# Patient Record
Sex: MALE | Race: WHITE | NOT HISPANIC OR LATINO | Employment: OTHER
[De-identification: names, ages, dates, MRNs, and addresses within clinical notes are randomized per-mention and may not be internally consistent; named-entity substitution may affect disease eponyms.]

---

## 2017-05-02 ENCOUNTER — RECORDS - HEALTHEAST (OUTPATIENT)
Dept: ADMINISTRATIVE | Facility: OTHER | Age: 62
End: 2017-05-02

## 2017-05-22 ENCOUNTER — HOSPITAL ENCOUNTER (OUTPATIENT)
Dept: NUCLEAR MEDICINE | Facility: CLINIC | Age: 62
Discharge: HOME | End: 2017-05-22
Attending: ORTHOPAEDIC SURGERY

## 2017-05-22 ENCOUNTER — COMMUNICATION - HEALTHEAST (OUTPATIENT)
Dept: TELEHEALTH | Facility: CLINIC | Age: 62
End: 2017-05-22

## 2017-05-22 DIAGNOSIS — M25.562 LEFT KNEE PAIN: ICD-10-CM

## 2018-01-09 ENCOUNTER — RECORDS - HEALTHEAST (OUTPATIENT)
Dept: ADMINISTRATIVE | Facility: OTHER | Age: 63
End: 2018-01-09

## 2021-05-31 ENCOUNTER — RECORDS - HEALTHEAST (OUTPATIENT)
Dept: ADMINISTRATIVE | Facility: CLINIC | Age: 66
End: 2021-05-31

## 2022-01-21 ENCOUNTER — HOSPITAL ENCOUNTER (EMERGENCY)
Facility: CLINIC | Age: 67
Discharge: HOME | End: 2022-01-21
Attending: EMERGENCY MEDICINE | Admitting: EMERGENCY MEDICINE
Payer: MEDICARE

## 2022-01-21 ENCOUNTER — APPOINTMENT (OUTPATIENT)
Dept: ULTRASOUND IMAGING | Facility: CLINIC | Age: 67
End: 2022-01-21
Attending: EMERGENCY MEDICINE
Payer: MEDICARE

## 2022-01-21 ENCOUNTER — APPOINTMENT (OUTPATIENT)
Dept: CT IMAGING | Facility: CLINIC | Age: 67
End: 2022-01-21
Attending: EMERGENCY MEDICINE
Payer: MEDICARE

## 2022-01-21 VITALS
HEART RATE: 67 BPM | SYSTOLIC BLOOD PRESSURE: 129 MMHG | TEMPERATURE: 97.8 F | WEIGHT: 185 LBS | OXYGEN SATURATION: 95 % | RESPIRATION RATE: 30 BRPM | DIASTOLIC BLOOD PRESSURE: 86 MMHG

## 2022-01-21 DIAGNOSIS — I48.0 PAROXYSMAL ATRIAL FIBRILLATION (H): ICD-10-CM

## 2022-01-21 LAB
ALBUMIN SERPL-MCNC: 4.3 G/DL (ref 3.5–5)
ALP SERPL-CCNC: 35 U/L (ref 45–120)
ALT SERPL W P-5'-P-CCNC: 27 U/L (ref 0–45)
ANION GAP SERPL CALCULATED.3IONS-SCNC: 10 MMOL/L (ref 5–18)
AST SERPL W P-5'-P-CCNC: 30 U/L (ref 0–40)
ATRIAL RATE - MUSE: 150 BPM
BILIRUB SERPL-MCNC: 0.9 MG/DL (ref 0–1)
BNP SERPL-MCNC: 21 PG/ML (ref 0–59)
BUN SERPL-MCNC: 24 MG/DL (ref 8–22)
CALCIUM SERPL-MCNC: 10.3 MG/DL (ref 8.5–10.5)
CHLORIDE BLD-SCNC: 107 MMOL/L (ref 98–107)
CO2 SERPL-SCNC: 22 MMOL/L (ref 22–31)
CREAT SERPL-MCNC: 1.15 MG/DL (ref 0.7–1.3)
D DIMER PPP FEU-MCNC: 0.66 UG/ML FEU (ref 0–0.5)
DIASTOLIC BLOOD PRESSURE - MUSE: NORMAL MMHG
ERYTHROCYTE [DISTWIDTH] IN BLOOD BY AUTOMATED COUNT: 13.9 % (ref 10–15)
GFR SERPL CREATININE-BSD FRML MDRD: 71 ML/MIN/1.73M2
GLUCOSE BLD-MCNC: 114 MG/DL (ref 70–125)
HCT VFR BLD AUTO: 54.1 % (ref 40–53)
HGB BLD-MCNC: 18.2 G/DL (ref 13.3–17.7)
HOLD SPECIMEN: NORMAL
INTERPRETATION ECG - MUSE: NORMAL
MAGNESIUM SERPL-MCNC: 2 MG/DL (ref 1.8–2.6)
MCH RBC QN AUTO: 30.5 PG (ref 26.5–33)
MCHC RBC AUTO-ENTMCNC: 33.6 G/DL (ref 31.5–36.5)
MCV RBC AUTO: 91 FL (ref 78–100)
P AXIS - MUSE: NORMAL DEGREES
PLATELET # BLD AUTO: 273 10E3/UL (ref 150–450)
POTASSIUM BLD-SCNC: 4.3 MMOL/L (ref 3.5–5)
PR INTERVAL - MUSE: NORMAL MS
PROT SERPL-MCNC: 7.6 G/DL (ref 6–8)
QRS DURATION - MUSE: 144 MS
QT - MUSE: 332 MS
QTC - MUSE: 521 MS
R AXIS - MUSE: 98 DEGREES
RBC # BLD AUTO: 5.96 10E6/UL (ref 4.4–5.9)
SARS-COV-2 RNA RESP QL NAA+PROBE: NEGATIVE
SODIUM SERPL-SCNC: 139 MMOL/L (ref 136–145)
SYSTOLIC BLOOD PRESSURE - MUSE: NORMAL MMHG
T AXIS - MUSE: -42 DEGREES
TROPONIN I SERPL-MCNC: <0.01 NG/ML (ref 0–0.29)
VENTRICULAR RATE- MUSE: 148 BPM
WBC # BLD AUTO: 10.6 10E3/UL (ref 4–11)

## 2022-01-21 PROCEDURE — 85379 FIBRIN DEGRADATION QUANT: CPT | Performed by: EMERGENCY MEDICINE

## 2022-01-21 PROCEDURE — 83880 ASSAY OF NATRIURETIC PEPTIDE: CPT | Performed by: EMERGENCY MEDICINE

## 2022-01-21 PROCEDURE — 96374 THER/PROPH/DIAG INJ IV PUSH: CPT | Mod: 59

## 2022-01-21 PROCEDURE — C9803 HOPD COVID-19 SPEC COLLECT: HCPCS

## 2022-01-21 PROCEDURE — 250N000011 HC RX IP 250 OP 636: Performed by: EMERGENCY MEDICINE

## 2022-01-21 PROCEDURE — 36415 COLL VENOUS BLD VENIPUNCTURE: CPT | Performed by: EMERGENCY MEDICINE

## 2022-01-21 PROCEDURE — 83735 ASSAY OF MAGNESIUM: CPT | Performed by: EMERGENCY MEDICINE

## 2022-01-21 PROCEDURE — 85027 COMPLETE CBC AUTOMATED: CPT | Performed by: EMERGENCY MEDICINE

## 2022-01-21 PROCEDURE — 80053 COMPREHEN METABOLIC PANEL: CPT | Performed by: EMERGENCY MEDICINE

## 2022-01-21 PROCEDURE — 87635 SARS-COV-2 COVID-19 AMP PRB: CPT | Performed by: EMERGENCY MEDICINE

## 2022-01-21 PROCEDURE — 93971 EXTREMITY STUDY: CPT | Mod: LT

## 2022-01-21 PROCEDURE — 93005 ELECTROCARDIOGRAM TRACING: CPT | Performed by: EMERGENCY MEDICINE

## 2022-01-21 PROCEDURE — 71275 CT ANGIOGRAPHY CHEST: CPT

## 2022-01-21 PROCEDURE — 93308 TTE F-UP OR LMTD: CPT

## 2022-01-21 PROCEDURE — 99285 EMERGENCY DEPT VISIT HI MDM: CPT | Mod: 25

## 2022-01-21 PROCEDURE — 250N000009 HC RX 250: Performed by: EMERGENCY MEDICINE

## 2022-01-21 PROCEDURE — 84484 ASSAY OF TROPONIN QUANT: CPT | Performed by: EMERGENCY MEDICINE

## 2022-01-21 RX ORDER — METOPROLOL TARTRATE 1 MG/ML
5 INJECTION, SOLUTION INTRAVENOUS ONCE
Status: DISCONTINUED | OUTPATIENT
Start: 2022-01-21 | End: 2022-01-21

## 2022-01-21 RX ORDER — IOPAMIDOL 755 MG/ML
100 INJECTION, SOLUTION INTRAVASCULAR ONCE
Status: COMPLETED | OUTPATIENT
Start: 2022-01-21 | End: 2022-01-21

## 2022-01-21 RX ORDER — METOPROLOL SUCCINATE 25 MG/1
25 TABLET, EXTENDED RELEASE ORAL DAILY
Qty: 7 TABLET | Refills: 0 | Status: SHIPPED | OUTPATIENT
Start: 2022-01-21 | End: 2022-01-25

## 2022-01-21 RX ORDER — METOPROLOL TARTRATE 1 MG/ML
5 INJECTION, SOLUTION INTRAVENOUS ONCE
Status: COMPLETED | OUTPATIENT
Start: 2022-01-21 | End: 2022-01-21

## 2022-01-21 RX ADMIN — METOPROLOL TARTRATE 5 MG: 1 INJECTION, SOLUTION INTRAVENOUS at 14:23

## 2022-01-21 RX ADMIN — IOPAMIDOL 68 ML: 755 INJECTION, SOLUTION INTRAVENOUS at 16:05

## 2022-01-21 ASSESSMENT — ENCOUNTER SYMPTOMS
SHORTNESS OF BREATH: 0
FEVER: 0
PALPITATIONS: 1
SHORTNESS OF BREATH: 1
GASTROINTESTINAL NEGATIVE: 1
WEAKNESS: 1

## 2022-01-21 NOTE — ED PROVIDER NOTES
EMERGENCY DEPARTMENT SIGN OUT NOTE        ED COURSE AND MEDICAL DECISION MAKING  Patient was signed out to me by Dr Flaco Wilder at 2:15 PM.  2:25 PM I met with the patient to gather history and to perform my initial exam. We discussed plans for the ED course, including diagnostic testing and treatment. PPE worn: surgical mask.        I saw and examined the patient.  The plan at the time of shift change was to discharge the patient with cardiology follow-up as long as he was stable and feeling well.  He has paroxysmal A. fib.  He received metoprolol here.  He has been in normal saline sinus rhythm since that time.  His Umberto score is very low.    He is otherwise healthy and plan is to have him follow-up with rapid access cardiology.  I have prescribed a low-dose of long-acting metoprolol that he can take at home.                    In brief, Lennox Capellan is a 66 year old male who initially presented for palpitations. Has a history of afib that is associated with caffeine use. This has been going on for 1.5-2 years and he has been consuming less and less caffeine. Patient is not on any medications. Patient is feeling weak. Denies chest pain and shortness of breath.    At time of sign out, disposition was pending labs and imaging.    FINAL IMPRESSION    1. Paroxysmal atrial fibrillation (H)        ED MEDS  Medications   metoprolol (LOPRESSOR) injection 5 mg (5 mg Intravenous Given 1/21/22 1423)   iopamidol (ISOVUE-370) solution 100 mL (68 mLs Intravenous Given 1/21/22 1605)       LAB  Labs Ordered and Resulted from Time of ED Arrival to Time of ED Departure   CBC WITH PLATELETS - Abnormal       Result Value    WBC Count 10.6      RBC Count 5.96 (*)     Hemoglobin 18.2 (*)     Hematocrit 54.1 (*)     MCV 91      MCH 30.5      MCHC 33.6      RDW 13.9      Platelet Count 273     COMPREHENSIVE METABOLIC PANEL - Abnormal    Sodium 139      Potassium 4.3      Chloride 107      Carbon Dioxide (CO2) 22      Anion Gap 10       Urea Nitrogen 24 (*)     Creatinine 1.15      Calcium 10.3      Glucose 114      Alkaline Phosphatase 35 (*)     AST 30      ALT 27      Protein Total 7.6      Albumin 4.3      Bilirubin Total 0.9      GFR Estimate 71     D DIMER QUANTITATIVE - Abnormal    D-Dimer Quantitative 0.66 (*)    TROPONIN I - Normal    Troponin I <0.01     B-TYPE NATRIURETIC PEPTIDE ([facility] ONLY) - Normal    BNP 21     MAGNESIUM - Normal    Magnesium 2.0     COVID-19 VIRUS (CORONAVIRUS) BY PCR - Normal    SARS CoV2 PCR Negative             RADIOLOGY    CT Chest Pulmonary Embolism w Contrast   Final Result   IMPRESSION:   1.  No pulmonary emboli. Normal thoracic aorta.   2.  Minimal calcified atheromatous plaque proximal LAD coronary artery.   3.  Chest otherwise negative.      US Lower Extremity Venous Duplex Left   Final Result   IMPRESSION:   1.  No deep venous thrombosis in the left lower extremity.      POC US ECHO LIMITED   Final Result          DISCHARGE MEDS  New Prescriptions    METOPROLOL SUCCINATE ER (TOPROL-XL) 25 MG 24 HR TABLET    Take 1 tablet (25 mg) by mouth daily for 7 days         Rivera Meyers MD  Emergency Medicine  [facility] EMERGENCY ROOM  [address] [place] [id number] [phone]     Rivera Meyers MD  01/21/22 2554

## 2022-01-21 NOTE — ED PROVIDER NOTES
EMERGENCY DEPARTMENT ENCOUNTER      NAME: Lennox Capellan  AGE: 66 year old male  YOB: 1955  MRN: [id number]  EVALUATION DATE & TIME: 1/21/2022  1:19 PM    PCP: Ivonne Mallory    ED PROVIDER: Flaco Wilder MD        Chief Complaint   Patient presents with     Irregular Heart Beat         FINAL IMPRESSION:  1. Paroxysmal atrial fibrillation (H)          ED COURSE & MEDICAL DECISION MAKING:    Pertinent Labs & Imaging studies reviewed. (See chart for details)  66 year old male presents to the Emergency Department for evaluation of rapid heart rate, shortness of breath with exertion when rapid heart rate is present    EKG shows A. fib RVR with possible right bundle for labs including D-dimer ordered    1:33 PM I met the patient and performed my initial interview and exam.    At the conclusion of the encounter I discussed the results of all of the tests and the disposition. The questions were answered. The patient or family acknowledged understanding and was agreeable with the care plan.     ED Course as of 01/21/22 1511   Fri Jan 21, 2022   1506 Patient is previously healthy 66-year-old who has had what sounds like intermittent paroxysmal A. fib over the last year.  He has cut out caffeine out of his life, does not drink, does not use drugs, and is not of heart disease   1506 Presented to primary care 2 days ago and was referred to cardiology   1506 EKG supposedly at primary care 2 days ago showed sinus rhythm   1507 Here patient is in A. fib with RVR.  Point-of-care ultrasound performed without pericardial effusion, grossly normal EF, prominent RV however   1507 Elevated D-dimer for age of therefore ultrasounds of left leg ordered and CT chest ordered to evaluate for pulmonary emboli and DVT   1507 Patient reports chronic left knee pain and swelling secondary to poor surgical outcome from knee replacement   1507 Cellulitis unlikely   1507 SARS CoV2 PCR: Negative   1507 Troponin I: <0.01   1507 ACS  "unlikely   1508 BNP: 21   1508 Magnesium: 2.0  Heart failure unlikely   1508 Hemoglobin(!): 18.2  Patient denies any vomiting or diarrhea   1508 WBC: 10.6   1508 Sepsis highly unlikely   1508 Potassium: 4.3   1508 IV metoprolol ordered.  Patient sent to Dr. Meyers pending completion of CT, ultrasound, rate control and reassessment             MEDICATIONS GIVEN IN THE EMERGENCY:  Medications   metoprolol (LOPRESSOR) injection 5 mg (5 mg Intravenous Given 1/21/22 1423)       NEW PRESCRIPTIONS STARTED AT TODAY'S ER VISIT  New Prescriptions    No medications on file          =================================================================    HPI    Triage note  \"Patient is here with a rapid irregular heart rate on and off for a few hours. Hx of irregular heart rate in the past and decreased caffeine it went away. He denies shortness of breath and nausea. He does feel weak.    \"      Patient information was obtained from: patient     Use of : N/A         Lennox Capellan is a 66 year old male with a pertinent history of atrial fibrillation who presents to this ED by walk in for evaluation of irregular heart beat.    Patient reports he has a history of atrial fibrillation that is usually brought on with caffeine use. This has been going on for 1.5-2 years and he has been consuming less and less caffeine. Patient is not on any medications. Patient is feeling weak. He comes into the ED because he is concerned about his heart.  Patient does not drink alcohol. Denies chest pain, shortness of breath, or any other complaints at this time.  Denies drug use.  Denies vomiting, diarrhea, fever.  Does not smoke    REVIEW OF SYSTEMS   Review of Systems   Constitutional: Negative for fever.   Respiratory: Negative for shortness of breath.    Cardiovascular: Positive for palpitations. Negative for chest pain.   Gastrointestinal: Negative.    Neurological: Positive for weakness.   All other systems reviewed and are " "negative.       PAST MEDICAL HISTORY:  History reviewed. No pertinent past medical history.    PAST SURGICAL HISTORY:  History reviewed. No pertinent surgical history.        CURRENT MEDICATIONS:    No current outpatient medications on file.      ALLERGIES:  Allergies   Allergen Reactions     Codeine Unknown     Ibuprofen Other (See Comments)     \"makes my joints creak.\"     Opium Other (See Comments)     All opiates make pt unable to urinate.       FAMILY HISTORY:  History reviewed. No pertinent family history.    SOCIAL HISTORY:   Social History     Socioeconomic History     Marital status: Single     Spouse name: Not on file     Number of children: Not on file     Years of education: Not on file     Highest education level: Not on file   Occupational History     Not on file   Tobacco Use     Smoking status: Not on file     Smokeless tobacco: Not on file   Substance and Sexual Activity     Alcohol use: Not on file     Drug use: Not on file     Sexual activity: Not on file   Other Topics Concern     Not on file   Social History Narrative     Not on file     Social Determinants of Health     Financial Resource Strain: Not on file   Food Insecurity: Not on file   Transportation Needs: Not on file   Physical Activity: Not on file   Stress: Not on file   Social Connections: Not on file   Intimate Partner Violence: Not on file   Housing Stability: Not on file       VITALS:  BP (!) 154/118   Pulse (!) 151   Temp 97.8  F (36.6  C)   Resp 16   Wt 83.9 kg (185 lb)   SpO2 94%     PHYSICAL EXAM      Vitals: BP (!) 154/118   Pulse (!) 151   Temp 97.8  F (36.6  C)   Resp 16   Wt 83.9 kg (185 lb)   SpO2 94%   General: Appears in no acute distress, awake, alert, interactive.  Eyes: Conjunctivae non-injected. Sclera anicteric.  HENT: Atraumatic.  Neck: Supple.  Cardiac: Irregularly irregular.   Respiratory/Chest: Respiration unlabored.  Abdomen: non distended  Musculoskeletal: Normal extremities. No edema or " erythema.  Skin: Normal color. No rash or diaphoresis.  Neurologic: Face symmetric, moves all extremities spontaneously. Speech clear.  Psychiatric: Oriented to person, place, and time. Affect appropriate.       LAB:  All pertinent labs reviewed and interpreted.  Results for orders placed or performed during the hospital encounter of 01/21/22   Extra Blue Top Tube   Result Value Ref Range    Hold Specimen JIC    Extra Red Top Tube   Result Value Ref Range    Hold Specimen JIC    Extra Green Top (Lithium Heparin) Tube   Result Value Ref Range    Hold Specimen JIC    Extra Purple Top Tube   Result Value Ref Range    Hold Specimen JIC    CBC (+ platelets, no diff)   Result Value Ref Range    WBC Count 10.6 4.0 - 11.0 10e3/uL    RBC Count 5.96 (H) 4.40 - 5.90 10e6/uL    Hemoglobin 18.2 (H) 13.3 - 17.7 g/dL    Hematocrit 54.1 (H) 40.0 - 53.0 %    MCV 91 78 - 100 fL    MCH 30.5 26.5 - 33.0 pg    MCHC 33.6 31.5 - 36.5 g/dL    RDW 13.9 10.0 - 15.0 %    Platelet Count 273 150 - 450 10e3/uL   Comprehensive metabolic panel   Result Value Ref Range    Sodium 139 136 - 145 mmol/L    Potassium 4.3 3.5 - 5.0 mmol/L    Chloride 107 98 - 107 mmol/L    Carbon Dioxide (CO2) 22 22 - 31 mmol/L    Anion Gap 10 5 - 18 mmol/L    Urea Nitrogen 24 (H) 8 - 22 mg/dL    Creatinine 1.15 0.70 - 1.30 mg/dL    Calcium 10.3 8.5 - 10.5 mg/dL    Glucose 114 70 - 125 mg/dL    Alkaline Phosphatase 35 (L) 45 - 120 U/L    AST 30 0 - 40 U/L    ALT 27 0 - 45 U/L    Protein Total 7.6 6.0 - 8.0 g/dL    Albumin 4.3 3.5 - 5.0 g/dL    Bilirubin Total 0.9 0.0 - 1.0 mg/dL    GFR Estimate 71 >60 mL/min/1.73m2   Troponin I (now)   Result Value Ref Range    Troponin I <0.01 0.00 - 0.29 ng/mL   B-Type Natriuretic Peptide ([facility] Only)   Result Value Ref Range    BNP 21 0 - 59 pg/mL   D dimer quantitative   Result Value Ref Range    D-Dimer Quantitative 0.66 (H) 0.00 - 0.50 ug/mL FEU   Result Value Ref Range    Magnesium 2.0 1.8 - 2.6 mg/dL   Asymptomatic COVID-19  Virus (Coronavirus) by PCR Nasopharyngeal    Specimen: Nasopharyngeal; Swab   Result Value Ref Range    SARS CoV2 PCR Negative Negative       RADIOLOGY:  Reviewed all pertinent imaging. Please see official radiology report.  POC US ECHO LIMITED    (Results Pending)   CT Chest Pulmonary Embolism w Contrast    (Results Pending)   US Lower Extremity Venous Duplex Left    (Results Pending)       EKG:    Performed at: 1317    Impression: atrial fibrillation with RVR. RBBB.     Rate: 148  Rhythm: atrial fibrillation with RVR.  Axis: 98  IL Interval: *  QRS Interval: 144  QTc Interval: 521  ST Changes: No ST elevations or depressions.   Comparison: None available for comparison.     I have independently reviewed and interpreted the EKG(s) documented above.    PROCEDURES:     POC US ECHO LIMITED    Date/Time: 1/21/2022 3:10 PM  Performed by: Oscar Wilder MD  Authorized by: Oscar Wilder MD     Procedure details:     Indications: shortness of breath    Comments:      Technically difficult exam.  No significant pericardial effusion, irregular regular rhythm, grossly normal EF, RV appears prominent.  Images saved and uploaded        I, Lucas Shipman, am serving as a scribe to document services personally performed by Oscar Wilder MD based on my observation and the provider's statements to me. I, Dr. Oscar Wilder, attest that Lucas Shipman is acting in a scribe capacity, has observed my performance of the services and has documented them in accordance with my direction.    Oscar Wilder MD  Emergency Medicine  [facility] EMERGENCY ROOM  [address] [facility] [address]  [phone]     Oscar Wilder MD  01/21/22 1750

## 2022-01-21 NOTE — ED TRIAGE NOTES
Patient is here with a rapid irregular heart rate on and off for a few hours. Hx of irregular heart rate in the past and decreased caffeine it went away. He denies shortness of breath and nausea. He does feel weak.

## 2022-01-25 ENCOUNTER — OFFICE VISIT (OUTPATIENT)
Dept: CARDIOLOGY | Facility: CLINIC | Age: 67
End: 2022-01-25
Attending: EMERGENCY MEDICINE
Payer: MEDICARE

## 2022-01-25 VITALS
SYSTOLIC BLOOD PRESSURE: 136 MMHG | RESPIRATION RATE: 16 BRPM | DIASTOLIC BLOOD PRESSURE: 74 MMHG | HEIGHT: 71 IN | WEIGHT: 191 LBS | HEART RATE: 72 BPM | BODY MASS INDEX: 26.74 KG/M2

## 2022-01-25 DIAGNOSIS — I48.0 PAROXYSMAL ATRIAL FIBRILLATION (H): ICD-10-CM

## 2022-01-25 DIAGNOSIS — I25.10 CORONARY ARTERY CALCIFICATION SEEN ON CT SCAN: Primary | ICD-10-CM

## 2022-01-25 PROCEDURE — 99204 OFFICE O/P NEW MOD 45 MIN: CPT | Performed by: GENERAL ACUTE CARE HOSPITAL

## 2022-01-25 RX ORDER — METOPROLOL SUCCINATE 25 MG/1
25 TABLET, EXTENDED RELEASE ORAL DAILY
Qty: 90 TABLET | Refills: 3 | Status: SHIPPED | OUTPATIENT
Start: 2022-01-25 | End: 2022-02-24

## 2022-01-25 ASSESSMENT — MIFFLIN-ST. JEOR: SCORE: 1673.5

## 2022-01-25 NOTE — PATIENT INSTRUCTIONS
1. We will continue the metoprolol.  2. We will have you see our Atrial Fibrillation clinic, to discuss other options including an ablation.    Patient Education     Understanding Atrial Fibrillation     An arrhythmia is any problem with the speed or pattern of the heartbeat. Atrial fibrillation (AFib) is the most common type of arrhythmia. It causes fast, chaotic electrical signals in the atria. This makes it hard for the heart to work as it should. It also affects how much blood your heart can pump out to the body.  AFib may occur once in a while and go away on its own. Or it may continue for longer periods and need treatment.  AFib can lead to serious problems, such as stroke. Your healthcare provider will need to monitor and manage it.    What happens during atrial fibrillation?   The heart has an electrical system that sends signals to control the heartbeat. As signals move through the heart, they tell the heart s upper chambers (atria) and lower chambers (ventricles) when to squeeze (contract) and relax. This lets blood move through the heart and out to the body and lungs.  With AFib, the atria receive abnormal signals. This causes them to contract in a fast and irregular way, and out of sync with the ventricles. When this happens, the atria also have a harder time moving blood into the ventricles. Blood may then pool in the atria. This increases the risk for blood clots and stroke. The ventricles also may contract too quickly and irregularly. As a result, they may not pump blood to the body and lungs as well as they should. This can weaken the heart muscle over time and cause heart failure.  What causes atrial fibrillation?  AFib is more common in older adults. It has many possible causes:    Coronary artery disease    Heart valve disease    Heart attack    Heart surgery    High blood pressure    Thyroid disease    Diabetes    Lung disease    Sleep apnea    Heavy alcohol use  In some cases of AFib, doctors  don't know the cause.  What are the symptoms of atrial fibrillation?  AFib may not cause symptoms. If symptoms do occur, they may include:    A fast, pounding, irregular heartbeat    Shortness of breath    Tiredness    Dizziness or fainting    Chest pain  How is atrial fibrillation treated?  Treatments for AFib can include any of the options below.    Medicines. You may be prescribed:  ? Heart rate medicines to help slow down the heartbeat  ? Heart rhythm medicines to help the heart beat more regularly  ? Blood thinners or anti-clotting medicines to help reduce the risk for blood clots and stroke.    Left atrial appendage closure. Your healthcare provider may advise this device to prevent stroke. You may need if you are at high risk for stroke but have problems taking blood-thinner (anticoagulant) medicines. The device is placed in the part of the heart where most clots form. This area is called the left atrial appendage (MARY). It's a pouch-like structure in the muscle wall of the left atrium. The device closes off the MARY to prevent clots moving from the heart to the brain and causing a stroke.    Electrical cardioversion. Your healthcare provider uses special pads or paddles to send one or more brief electrical shocks to the heart. This can help reset the heartbeat to normal.    Ablation. Long, thin tubes (catheters) are threaded through a blood vessel to the heart. There, the catheters send out hot or cold energy to the areas causing the abnormal signals. This energy destroys the problem tissue or cells. This improves the chances that your heart will stay in normal rhythm without using medicines. If your heart rate and rhythm can t be controlled, you may need ablation and a pacemaker. These will help control the heart rate and regularity of the heartbeat.    Surgery. During surgery, your healthcare provider may use different methods to create scar tissue in the areas of the heart causing the abnormal signals. The  scar tissue disrupts the abnormal signals and may stop AFib from occurring.    Hybrid surgical-catheter ablation for AFib. This treatment is used for people with AFib that continues or is hard to treat.. It combines surgery with a catheter ablation. During the surgery, the surgeon makes small cuts (incisions) between the ribs in the chest or in the abdomen near the sternum. The surgeon puts a scope through the incisions to get to the backside of the heart. The catheter portion of the procedure is done by putting a catheter into a vein in the groin. The catheter is guided to the inside of the heart. Using the catheter, radiofrequency ablation is done to destroy the tissue inside the heart that is causing the AFib. Using both of these approaches may work better to block the abnormal electrical signals and be a more permanent treatment for persistent AFib.  What are possible complications of atrial fibrillation?  Complications can include:    Blood clots    Stroke    Heart failure. This problem occurs when the heart muscle weakens so much that it can no longer pump blood well.  When should I call my healthcare provider?  Call your healthcare provider right away if you have any of these:    Symptoms that don t get better with treatment, or get worse    New symptoms  Lukas last reviewed this educational content on 4/1/2019 2000-2021 [employer]. All rights reserved. This information is not intended as a substitute for professional medical care. Always follow your healthcare professional's instructions.

## 2022-01-25 NOTE — LETTER
1/25/2022    Ivonne Mallory MD  [address] [facility] [address]    RE: Lennox Capellan       Dear Colleague,     I had the pleasure of seeing Lennox Capellan in the [facility].  HEART CARE ENCOUNTER NOTE      Thank you, Dr. Rivera Meyers for asking the [facility] Heart Care team to see Mr. Lennox Capellan to evaluate atrial fibrillation.    Assessment/Recommendations   Assessment:    1. Paroxysmal coarse atrial fibrillation versus atrial flutter. Very symptomatic with episodes, doing better on metoprolol. [id number] score is 1.  2. Mild coronary artery calcification seen on chest CT. His lipid panel and other risk factors seem well-controlled.  3. BMI 26.64.    Plan:  1. Continue metoprolol succinate 25 mg daily.  2. Aspirin 81 mg daily.  3. He would like to avoid medication if possible and is very interested in pulmonary vein isolation. Will refer to Atrial Fibrillation clinic to discuss further.  4. Despite his reasonable lipid profile, his age and coronary artery calcium seen on chest CT would still make moderate-intensity statin therapy a consideration for him. He would like to wait on this for now.  5. Follow-up with me as needed.          History of Present Illness   Mr. Lennox Capellan is a 66 year old male with no significant past history presenting for urgent evaluation of new atrial fibrillation. He has been having intermittent palpitations for the past 2 years. It seems worse with caffeine which he cut out and had some improvement, but his symptoms became severe and persisted the other day prompting him to come to the ED on 1/21/2022. His heart was racing, causing chest discomfort and shortness of breath. In the ED, he was hypertensive and tachycardic. ECG showed coarse atrial fibrillation versus flutter with a heart rate of approximately 150 bpm. BNP and troponin were normal, but D-dimer was borderline elevated prompting CTA chest. This showed no pulmonary embolism or other  "acute pathology.    He was given IV metoprolol and converted to sinus rhythm and felt much better. He was discharged on oral metoprolol succinate 25 mg daily and has not had any symptoms since.    He otherwise is very active, walking several miles at a time. he denies any exertional chest pain/pressure/tightness, shortness of breath at rest or with exertion, light headedness/dizziness, pre-syncope, syncope, lower extremity swelling, paroxysmal nocturnal dyspnea (PND), or orthopnea.    He smokes marijuana for pain control and has done this for years. He has not noticed a correlation between marijuana use and his symptoms. He feels he does not snore or have risk factors for sleep apnea.     Cardiac Problems and Cardiac Diagnostics     Most Recent Cardiac testing:  ECG dated 1/21/2022 (personaly reviewed and interpreted): coarse atrial fibrillation with rapid ventricular response, RBBB with QRS duration 144 ms    CTA chest 1/21/2022 (report reviewed):  1.  No pulmonary emboli. Normal thoracic aorta.  2.  Minimal calcified atheromatous plaque proximal LAD coronary artery.  3.  Chest otherwise negative.     Medications  Allergies   Current Outpatient Medications   Medication Sig Dispense Refill     metoprolol succinate ER (TOPROL-XL) 25 MG 24 hr tablet Take 1 tablet (25 mg) by mouth daily for 7 days 7 tablet 0      Allergies   Allergen Reactions     Codeine Unknown     Ibuprofen Other (See Comments)     \"makes my joints creak.\"     Opium Other (See Comments)     All opiates make pt unable to urinate.        Physical Examination Review of Systems   /74 (BP Location: Right arm, Patient Position: Sitting, Cuff Size: Adult Regular)   Pulse 72   Resp 16   Ht 1.803 m (5' 11\")   Wt 86.6 kg (191 lb)   BMI 26.64 kg/m    Body mass index is 26.64 kg/m .  Wt Readings from Last 3 Encounters:   01/25/22 86.6 kg (191 lb)   01/21/22 83.9 kg (185 lb)       General Appearance:   Pleasant  male, appears  stated age. no acute " "distress, normal body habitus   ENT/Mouth: Facemask.      EYES:  no scleral icterus, normal conjunctivae   Neck: no carotid bruits. No anterior cervical lymphadenopaty   Respiratory:   lungs are clear to auscultation, no rales or wheezing, equal chest wall expansion    Cardiovascular:   Regular rhythm, normal rate. Normal first and second heart sounds with no murmurs, rubs, or gallops; the carotid, radial and posterior tibial pulses are intact, Jugular venous pressure normal, no edema bilaterally    Abdomen/GI:  no organomegaly, masses, bruits, or tenderness; bowel sounds are present   Extremities: no cyanosis or clubbing   Skin: no xanthelasma, warm.    Heme/lymph/ Immunology No apparent bleeding noted.   Neurologic: Alert and oriented. normal gait, no tremors     Psychiatric: Pleasant, calm, appropriate affect.    A complete 10 system review of systems was performed and is negative except as mentioned in the HPI/subjective.         Past History   Past Medical History:   Acute sinusitis, unspecified  Sinusitis, Acute, ct done sinus infction   Rash and other nonspecific skin eruption  CHRONIC RASH TO RIGHT FOOD, UNKNOWN CAUSE PER PT   Knee injury       Meniscal injury       S/P knee replacement         Past Surgical History:   KNEE ARTHROSCOPY     BOTH KNEES     SHOULDER SURGERY          KNEE REPLACEMENT 2016 - 2016 Left by Dr. Cornell Brito at [facility], Vielka Attune implant     LEFT TOTAL KNEE ARTHROPLASTY REVISION  2018 Left Dr. Ponce       Family History:   Ulcers Brother        Cancer Father    Lung CA  age 78   Stroke Mother    \"brought on by birth control pills\"   No history of atrial fibrillation or other heart issues to his knowledge.    Social History:   Social History     Socioeconomic History     Marital status: Single     Spouse name: Not on file     Number of children: Not on file     Years of education: Not on file     Highest education level: Not on file "   Occupational History     Not on file   Tobacco Use     Smoking status: Former Smoker     Smokeless tobacco: Never Used   Substance and Sexual Activity     Alcohol use: Not on file     Drug use: Not on file     Sexual activity: Not on file   Other Topics Concern     Not on file   Social History Narrative     Not on file     Social Determinants of Health     Financial Resource Strain: Not on file   Food Insecurity: Not on file   Transportation Needs: Not on file   Physical Activity: Not on file   Stress: Not on file   Social Connections: Not on file   Intimate Partner Violence: Not on file   Housing Stability: Not on file              Lab Results    Chemistry/lipid CBC Cardiac Enzymes/BNP/TSH/INR   Lab Results   Component Value Date    CR 1.15 01/21/2022    BUN 24 (H) 01/21/2022    POTASSIUM 4.3 01/21/2022     01/21/2022    CO2 22 01/21/2022   2/15/2021  CHOLESTEROL,TOTAL 100 - 199 mg/dL 172    TRIGLYCERIDES <150 mg/dL 93    HDL CHOLESTEROL >40 mg/dL 70    NON-HDL CHOLESTEROL <145 mg/dl 102    CHOL/HDL RATIO            <4.50                 2.46    LDL CHOLESTEROL <=130 mg/dL 83         Lab Results   Component Value Date    WBC 10.6 01/21/2022    HGB 18.2 (H) 01/21/2022    HCT 54.1 (H) 01/21/2022    MCV 91 01/21/2022     01/21/2022      Lab Results   Component Value Date    TROPONINI <0.01 01/21/2022    BNP 21 01/21/2022 1/19/2022  TSH 0.35 - 4.94 uIU/mL 1.46               Aldo Topete MD [facility]  Non-Invasive Cardiologist  [facility]  Pager [phone]      cc:   Rivera Meyers MD  [address] [place] [address]

## 2022-01-25 NOTE — PROGRESS NOTES
HEART CARE ENCOUNTER NOTE      Thank you, Dr. Rivera Meyers for asking the [facility] Heart Care team to see Mr. Lennox Capellan to evaluate atrial fibrillation.      Assessment/Recommendations   Assessment:    1. Paroxysmal coarse atrial fibrillation versus atrial flutter. Very symptomatic with episodes, doing better on metoprolol. [id number] score is 1.  2. Mild coronary artery calcification seen on chest CT. His lipid panel and other risk factors seem well-controlled.  3. BMI 26.64.    Plan:  1. Continue metoprolol succinate 25 mg daily.  2. Aspirin 81 mg daily.  3. He would like to avoid medication if possible and is very interested in pulmonary vein isolation. Will refer to Atrial Fibrillation clinic to discuss further.  4. Despite his reasonable lipid profile, his age and coronary artery calcium seen on chest CT would still make moderate-intensity statin therapy a consideration for him. He would like to wait on this for now.  5. Follow-up with me as needed.          History of Present Illness   Mr. Lennox Capellan is a 66 year old male with no significant past history presenting for urgent evaluation of new atrial fibrillation. He has been having intermittent palpitations for the past 2 years. It seems worse with caffeine which he cut out and had some improvement, but his symptoms became severe and persisted the other day prompting him to come to the ED on 1/21/2022. His heart was racing, causing chest discomfort and shortness of breath. In the ED, he was hypertensive and tachycardic. ECG showed coarse atrial fibrillation versus flutter with a heart rate of approximately 150 bpm. BNP and troponin were normal, but D-dimer was borderline elevated prompting CTA chest. This showed no pulmonary embolism or other acute pathology.    He was given IV metoprolol and converted to sinus rhythm and felt much better. He was discharged on oral metoprolol succinate 25 mg daily and has not had any symptoms since.    He  "otherwise is very active, walking several miles at a time. he denies any exertional chest pain/pressure/tightness, shortness of breath at rest or with exertion, light headedness/dizziness, pre-syncope, syncope, lower extremity swelling, paroxysmal nocturnal dyspnea (PND), or orthopnea.    He smokes marijuana for pain control and has done this for years. He has not noticed a correlation between marijuana use and his symptoms. He feels he does not snore or have risk factors for sleep apnea.     Cardiac Problems and Cardiac Diagnostics     Most Recent Cardiac testing:  ECG dated 1/21/2022 (personaly reviewed and interpreted): coarse atrial fibrillation with rapid ventricular response, RBBB with QRS duration 144 ms    CTA chest 1/21/2022 (report reviewed):  1.  No pulmonary emboli. Normal thoracic aorta.  2.  Minimal calcified atheromatous plaque proximal LAD coronary artery.  3.  Chest otherwise negative.     Medications  Allergies   Current Outpatient Medications   Medication Sig Dispense Refill     metoprolol succinate ER (TOPROL-XL) 25 MG 24 hr tablet Take 1 tablet (25 mg) by mouth daily for 7 days 7 tablet 0      Allergies   Allergen Reactions     Codeine Unknown     Ibuprofen Other (See Comments)     \"makes my joints creak.\"     Opium Other (See Comments)     All opiates make pt unable to urinate.        Physical Examination Review of Systems   /74 (BP Location: Right arm, Patient Position: Sitting, Cuff Size: Adult Regular)   Pulse 72   Resp 16   Ht 1.803 m (5' 11\")   Wt 86.6 kg (191 lb)   BMI 26.64 kg/m    Body mass index is 26.64 kg/m .  Wt Readings from Last 3 Encounters:   01/25/22 86.6 kg (191 lb)   01/21/22 83.9 kg (185 lb)       General Appearance:   Pleasant  male, appears  stated age. no acute distress, normal body habitus   ENT/Mouth: Facemask.      EYES:  no scleral icterus, normal conjunctivae   Neck: no carotid bruits. No anterior cervical lymphadenopaty   Respiratory:   lungs are clear to " "auscultation, no rales or wheezing, equal chest wall expansion    Cardiovascular:   Regular rhythm, normal rate. Normal first and second heart sounds with no murmurs, rubs, or gallops; the carotid, radial and posterior tibial pulses are intact, Jugular venous pressure normal, no edema bilaterally    Abdomen/GI:  no organomegaly, masses, bruits, or tenderness; bowel sounds are present   Extremities: no cyanosis or clubbing   Skin: no xanthelasma, warm.    Heme/lymph/ Immunology No apparent bleeding noted.   Neurologic: Alert and oriented. normal gait, no tremors     Psychiatric: Pleasant, calm, appropriate affect.    A complete 10 system review of systems was performed and is negative except as mentioned in the HPI/subjective.         Past History   Past Medical History:   Acute sinusitis, unspecified  Sinusitis, Acute, ct done sinus infction   Rash and other nonspecific skin eruption  CHRONIC RASH TO RIGHT FOOD, UNKNOWN CAUSE PER PT   Knee injury       Meniscal injury       S/P knee replacement         Past Surgical History:   KNEE ARTHROSCOPY     BOTH KNEES     SHOULDER SURGERY          KNEE REPLACEMENT 2016 - 2016 Left by Dr. Cornell Brito at [facility], Vileka Attune implant     LEFT TOTAL KNEE ARTHROPLASTY REVISION  2018 Left Dr. Ponce       Family History:   Ulcers Brother        Cancer Father    Lung CA  age 78   Stroke Mother    \"brought on by birth control pills\"   No history of atrial fibrillation or other heart issues to his knowledge.    Social History:   Social History     Socioeconomic History     Marital status: Single     Spouse name: Not on file     Number of children: Not on file     Years of education: Not on file     Highest education level: Not on file   Occupational History     Not on file   Tobacco Use     Smoking status: Former Smoker     Smokeless tobacco: Never Used   Substance and Sexual Activity     Alcohol use: Not on file     Drug use: Not on file     " Sexual activity: Not on file   Other Topics Concern     Not on file   Social History Narrative     Not on file     Social Determinants of Health     Financial Resource Strain: Not on file   Food Insecurity: Not on file   Transportation Needs: Not on file   Physical Activity: Not on file   Stress: Not on file   Social Connections: Not on file   Intimate Partner Violence: Not on file   Housing Stability: Not on file              Lab Results    Chemistry/lipid CBC Cardiac Enzymes/BNP/TSH/INR   Lab Results   Component Value Date    CR 1.15 01/21/2022    BUN 24 (H) 01/21/2022    POTASSIUM 4.3 01/21/2022     01/21/2022    CO2 22 01/21/2022   2/15/2021  CHOLESTEROL,TOTAL 100 - 199 mg/dL 172    TRIGLYCERIDES <150 mg/dL 93    HDL CHOLESTEROL >40 mg/dL 70    NON-HDL CHOLESTEROL <145 mg/dl 102    CHOL/HDL RATIO            <4.50                 2.46    LDL CHOLESTEROL <=130 mg/dL 83         Lab Results   Component Value Date    WBC 10.6 01/21/2022    HGB 18.2 (H) 01/21/2022    HCT 54.1 (H) 01/21/2022    MCV 91 01/21/2022     01/21/2022      Lab Results   Component Value Date    TROPONINI <0.01 01/21/2022    BNP 21 01/21/2022 1/19/2022  TSH 0.35 - 4.94 uIU/mL 1.46             Aldo Topete MD [facility]  Non-Invasive Cardiologist  [facility]  Pager [phone]

## 2022-02-08 ENCOUNTER — HOSPITAL ENCOUNTER (OUTPATIENT)
Dept: CARDIOLOGY | Facility: HOSPITAL | Age: 67
Discharge: HOME | End: 2022-02-08
Attending: GENERAL ACUTE CARE HOSPITAL | Admitting: GENERAL ACUTE CARE HOSPITAL
Payer: MEDICARE

## 2022-02-08 DIAGNOSIS — I48.0 PAROXYSMAL ATRIAL FIBRILLATION (H): ICD-10-CM

## 2022-02-08 LAB — LVEF ECHO: NORMAL

## 2022-02-08 PROCEDURE — 93306 TTE W/DOPPLER COMPLETE: CPT

## 2022-02-08 PROCEDURE — 93306 TTE W/DOPPLER COMPLETE: CPT | Mod: 26 | Performed by: INTERNAL MEDICINE

## 2022-02-11 ENCOUNTER — TELEPHONE (OUTPATIENT)
Dept: CARDIOLOGY | Facility: CLINIC | Age: 67
End: 2022-02-11
Payer: MEDICARE

## 2022-02-11 NOTE — TELEPHONE ENCOUNTER
Results reviewed with patient. Patient verbalizes understanding and agrees with plan to follow up in afib clinic 2/24/2022. No further questions or concerns at this time.

## 2022-02-11 NOTE — TELEPHONE ENCOUNTER
----- Message from Aldo Topete MD sent at 2/11/2022 12:29 PM CST -----  Echo overall looks fairly normal. Has AF clinic follow-up on 2/24/2022. No new recs at this time.    Thanks,  Aldo

## 2022-02-24 ENCOUNTER — OFFICE VISIT (OUTPATIENT)
Dept: CARDIOLOGY | Facility: CLINIC | Age: 67
End: 2022-02-24
Attending: GENERAL ACUTE CARE HOSPITAL
Payer: MEDICARE

## 2022-02-24 VITALS
HEIGHT: 71 IN | HEART RATE: 72 BPM | DIASTOLIC BLOOD PRESSURE: 70 MMHG | SYSTOLIC BLOOD PRESSURE: 128 MMHG | WEIGHT: 188 LBS | BODY MASS INDEX: 26.32 KG/M2 | RESPIRATION RATE: 16 BRPM

## 2022-02-24 DIAGNOSIS — I48.0 PAROXYSMAL ATRIAL FIBRILLATION (H): Primary | ICD-10-CM

## 2022-02-24 DIAGNOSIS — I25.10 CORONARY ARTERY CALCIFICATION SEEN ON CT SCAN: ICD-10-CM

## 2022-02-24 PROBLEM — Z96.652 PAIN DUE TO TOTAL LEFT KNEE REPLACEMENT (H): Status: ACTIVE | Noted: 2018-11-15

## 2022-02-24 PROBLEM — T84.84XA PAIN DUE TO TOTAL LEFT KNEE REPLACEMENT (H): Status: ACTIVE | Noted: 2018-11-15

## 2022-02-24 LAB
ATRIAL RATE - MUSE: 59 BPM
DIASTOLIC BLOOD PRESSURE - MUSE: NORMAL MMHG
INTERPRETATION ECG - MUSE: NORMAL
P AXIS - MUSE: 53 DEGREES
PR INTERVAL - MUSE: 158 MS
QRS DURATION - MUSE: 152 MS
QT - MUSE: 456 MS
QTC - MUSE: 451 MS
R AXIS - MUSE: 90 DEGREES
SYSTOLIC BLOOD PRESSURE - MUSE: NORMAL MMHG
T AXIS - MUSE: 15 DEGREES
VENTRICULAR RATE- MUSE: 59 BPM

## 2022-02-24 PROCEDURE — 99215 OFFICE O/P EST HI 40 MIN: CPT | Performed by: NURSE PRACTITIONER

## 2022-02-24 PROCEDURE — 93000 ELECTROCARDIOGRAM COMPLETE: CPT | Performed by: INTERNAL MEDICINE

## 2022-02-24 RX ORDER — ASPIRIN 81 MG/1
81 TABLET ORAL DAILY
COMMUNITY

## 2022-02-24 RX ORDER — METOPROLOL SUCCINATE 50 MG/1
50 TABLET, EXTENDED RELEASE ORAL DAILY
Qty: 90 TABLET | Refills: 3 | Status: SHIPPED | OUTPATIENT
Start: 2022-02-24

## 2022-02-24 NOTE — LETTER
2/24/2022    Ivonne Mallory MD  [address] [facility] [address]    RE: Lennox Capellan       Dear Colleague,     I had the pleasure of seeing Lennox Capellan in the [facility].    HEART CARE ELECTROPHYSIOLOGY CONSULTATON NOTE      [facility]  [phone]    Thank you, Dr. Topete, for asking the [facility] Electrophysiology team to see Mr. Lennox Capellan to evaluate atrial fibrillation.    Assessment/Recommendations   Assessment/Plan:  1.  Paroxysmal Atrial Fibrillation: Newly documented atrial fibrillation with rapid ventricular response, though symptomatic episodes have been increasing over the past 6 months.  Improved with start of low-dose metoprolol succinate, but he continues to have episodes.  Increase metoprolol succinate to 50 mg daily.    We had a lengthy discussion of the physiology and natural progression of atrial fibrillation and treatment options including rate control versus rhythm control depending upon the presence of symptoms.  We further discussed rhythm control with medications or pulmonary vein isolation ablation.  We discussed pulmonary vein isolation ablation procedure utilizing cryo or radiofrequency including the <1% risk for major complication, including but not limited to, stroke, myocardial or esophageal perforation, pulmonary vein stenosis, phrenic nerve injury, or death.  We also discussed the expected recovery and follow-up.  He was given information to review at home.  He is also done some research on his own and would like to proceed with PVI.    He was reassured that atrial fibrillation is not life-threatening, but carries an increased risk for stroke.  He has a [id number]-BBUc score of 2 for age 65-74 and CAD, though CAD is minimal.  Recommend initiation of oral anticoagulation therapy with a DOAC.  However, he has significant financial constraints, so we will look into patient assistance programs.  In the interim, continue  aspirin 81 mg daily.    2.  Coronary artery disease: Mild calcifications in the proximal LAD seen on chest CTA.  Denies anginal symptoms.    Follow up with Dr. Galan to further discuss PVI     History of Present Illness/Subjective    HPI: Lennox Capellan is a 66 year old male who comes in today for EP consultation of atrial fibrillation.  He has a history of intermittent palpitations for the past 2 years and was newly diagnosed with atrial fibrillation with rapid ventricular response on 1/21/2022.  Symptoms consisted of tachypalpitations, fatigue, weakness, and shortness of breath.  He reports that palpitations were initially triggered by caffeine which he has eliminated.  Episodes have been increasing, particularly over the past couple of months.  He had an episode on 1/20/2022 that lasted for 6 hours associated with severe symptoms.  He had recurrence the next day and felt the episode was not going to stop, so he went to the ED.  He was evaluated in the ED and converted back to sinus rhythm after administration of IV metoprolol.  He was started on metoprolol succinate 25 mg daily.  Chest CTA showed mild calcifications in the proximal LAD.      Lennox states that he has had 3 episodes of A. fib since starting metoprolol, though episodes have only lasted about an hour.  He reports this is a significant improvement from previous.  He denies chest discomfort, palpitations, abdominal fullness/bloating or peripheral edema, shortness of breath, paroxysmal nocturnal dyspnea, orthopnea, lightheadedness, dizziness, pre-syncope, or syncope.    Cardiographics (EKG personally reviewed):  EKG done 2/24/2022 shows sinus rhythm at 59 bpm, right bundle branch block, QT/QTc interval measures 456/451 ms  EKG done 1/21/2022 shows atrial fibrillation with rapid ventricular response at 148 bpm, right bundle branch block    ECHO done 2/8/2022:  The left ventricle is normal in size.  Left ventricular systolic function is normal.  The  "visual ejection fraction is 60-65%.  No regional wall motion abnormalities noted.  The right ventricle is mildly dilated.  TAPSE is normal, which is consistent with normal right ventricular systolic  function.  The left atrium is mildly dilated.  The right atrium is mildly dilated.  Mobile intratrial septum.PFO is not fully excluded  No significant valve abnormality    Chest CTA done 1/21/2022:  1.  No pulmonary emboli. Normal thoracic aorta.  2.  Minimal calcified atheromatous plaque proximal LAD coronary artery.  3.  Chest otherwise negative.    I have reviewed and updated the patient's Past Medical History, Social History, Family History and Medication List.     Physical Examination  Review of Systems   Vitals: /70 (BP Location: Right arm, Patient Position: Sitting, Cuff Size: Adult Regular)   Pulse 72   Resp 16   Ht 1.803 m (5' 11\")   Wt 85.3 kg (188 lb)   BMI 26.22 kg/m    BMI= Body mass index is 26.22 kg/m .  Wt Readings from Last 3 Encounters:   02/24/22 85.3 kg (188 lb)   01/25/22 86.6 kg (191 lb)   01/21/22 83.9 kg (185 lb)       General Appearance:   Alert, well-appearing and in no acute distress.   HEENT: Atraumatic, normocephalic.  No scleral icterus, normal conjunctivae, EOMs intact, PERRL.  Wearing a mask.   Chest/Lungs:   Chest symmetric, spine straight.  Respirations unlabored.  Lungs are clear to auscultation.   Cardiovascular:   Regular rate and rhythm.  Normal first and second heart sounds with no murmurs, rubs, or gallops; radial and posterior tibial pulses are intact, No edema.   Abdomen:  Soft, nondistended, bowel sounds present.   Extremities: No cyanosis or clubbing.   Musculoskeletal: Moves all extremities.    Skin: Warm, dry, intact.    Neurologic: Mood and affect are appropriate.  Alert and oriented to person, place, time, and situation.        ROS: 10 point ROS neg other than the symptoms noted above in the HPI.         Medical History  Surgical History Family History Social " "History   Past Medical History:   Diagnosis Date     Atrial fibrillation (H)      Coronary artery disease     minimal calcification prox LAD seen on CT     Past Surgical History:   Procedure Laterality Date     REPLACEMENT TOTAL KNEE Left     x 2     Family History   Problem Relation Age of Onset     Cerebrovascular Disease Mother         due to birth control pills     No Known Problems Father      Peptic Ulcer Disease Brother         Social History     Socioeconomic History     Marital status: Single     Spouse name: Not on file     Number of children: Not on file     Years of education: Not on file     Highest education level: Not on file   Occupational History     Not on file   Tobacco Use     Smoking status: Former Smoker     Smokeless tobacco: Never Used   Substance and Sexual Activity     Alcohol use: Not on file     Drug use: Not on file     Sexual activity: Not on file   Other Topics Concern     Parent/sibling w/ CABG, MI or angioplasty before 65F 55M? Not Asked   Social History Narrative     Not on file     Social Determinants of Health     Financial Resource Strain: Not on file   Food Insecurity: Not on file   Transportation Needs: Not on file   Physical Activity: Not on file   Stress: Not on file   Social Connections: Not on file   Intimate Partner Violence: Not on file   Housing Stability: Not on file           Medications  Allergies   Current Outpatient Medications   Medication Sig Dispense Refill     aspirin 81 MG EC tablet Take 81 mg by mouth daily       metoprolol succinate ER (TOPROL-XL) 25 MG 24 hr tablet Take 1 tablet (25 mg) by mouth daily 90 tablet 3       Allergies   Allergen Reactions     Naproxen      Couldn't swallow     Codeine Unknown     Ibuprofen Other (See Comments)     \"makes my joints creak.\"     Opium Other (See Comments)     All opiates make pt unable to urinate.          Lab Results    Chemistry/lipid CBC Cardiac Enzymes/BNP/TSH/INR   No results for input(s): CHOL, HDL, LDL, TRIG, " CHOLHDLRATIO in the last 37197 hours.  No results for input(s): LDL in the last 64962 hours.  Recent Labs   Lab Test 01/21/22  1326      POTASSIUM 4.3   CHLORIDE 107   CO2 22      BUN 24*   CR 1.15   GFRESTIMATED 71   MARGUERITE 10.3     Recent Labs   Lab Test 01/21/22  1326   CR 1.15     No results for input(s): A1C in the last 11212 hours.       Recent Labs   Lab Test 01/21/22  1326   WBC 10.6   HGB 18.2*   HCT 54.1*   MCV 91        Recent Labs   Lab Test 01/21/22  1326   HGB 18.2*    Recent Labs   Lab Test 01/21/22  1326   TROPONINI <0.01     Recent Labs   Lab Test 01/21/22  1326   BNP 21     No results for input(s): TSH in the last 04378 hours.  No results for input(s): INR in the last 70905 hours.   54 minutes were spent on the date of encounter performing chart review, history and exam, documentation, and further activities as noted above.            Thank you for allowing me to participate in the care of your patient.      Sincerely,     LAURIE Hidalgo [facility]  cc:   Aldo Topete MD  [address] [address]

## 2022-02-24 NOTE — PROGRESS NOTES
HEART CARE ELECTROPHYSIOLOGY CONSULTATON NOTE      [facility]  [phone]    Thank you, Dr. Topete, for asking the [facility] Electrophysiology team to see Mr. Lennox Capellan to evaluate atrial fibrillation.    Assessment/Recommendations   Assessment/Plan:  1.  Paroxysmal Atrial Fibrillation: Newly documented atrial fibrillation with rapid ventricular response, though symptomatic episodes have been increasing over the past 6 months.  Improved with start of low-dose metoprolol succinate, but he continues to have episodes.  Increase metoprolol succinate to 50 mg daily.    We had a lengthy discussion of the physiology and natural progression of atrial fibrillation and treatment options including rate control versus rhythm control depending upon the presence of symptoms.  We further discussed rhythm control with medications or pulmonary vein isolation ablation.  We discussed pulmonary vein isolation ablation procedure utilizing cryo or radiofrequency including the <1% risk for major complication, including but not limited to, stroke, myocardial or esophageal perforation, pulmonary vein stenosis, phrenic nerve injury, or death.  We also discussed the expected recovery and follow-up.  He was given information to review at home.  He is also done some research on his own and would like to proceed with PVI.    He was reassured that atrial fibrillation is not life-threatening, but carries an increased risk for stroke.  He has a [id number] score of 2 for age 65-74 and CAD, though CAD is minimal.  Recommend initiation of oral anticoagulation therapy with a DOAC.  However, he has significant financial constraints, so we will look into patient assistance programs.  In the interim, continue aspirin 81 mg daily.    2.  Coronary artery disease: Mild calcifications in the proximal LAD seen on chest CTA.  Denies anginal symptoms.    Follow up with Dr. Galan to further discuss PVI      History of Present Illness/Subjective    HPI: Lennox Capellan is a 66 year old male who comes in today for EP consultation of atrial fibrillation.  He has a history of intermittent palpitations for the past 2 years and was newly diagnosed with atrial fibrillation with rapid ventricular response on 1/21/2022.  Symptoms consisted of tachypalpitations, fatigue, weakness, and shortness of breath.  He reports that palpitations were initially triggered by caffeine which he has eliminated.  Episodes have been increasing, particularly over the past couple of months.  He had an episode on 1/20/2022 that lasted for 6 hours associated with severe symptoms.  He had recurrence the next day and felt the episode was not going to stop, so he went to the ED.  He was evaluated in the ED and converted back to sinus rhythm after administration of IV metoprolol.  He was started on metoprolol succinate 25 mg daily.  Chest CTA showed mild calcifications in the proximal LAD.      Lennox states that he has had 3 episodes of A. fib since starting metoprolol, though episodes have only lasted about an hour.  He reports this is a significant improvement from previous.  He denies chest discomfort, palpitations, abdominal fullness/bloating or peripheral edema, shortness of breath, paroxysmal nocturnal dyspnea, orthopnea, lightheadedness, dizziness, pre-syncope, or syncope.    Cardiographics (EKG personally reviewed):  EKG done 2/24/2022 shows sinus rhythm at 59 bpm, right bundle branch block, QT/QTc interval measures 456/451 ms  EKG done 1/21/2022 shows atrial fibrillation with rapid ventricular response at 148 bpm, right bundle branch block    ECHO done 2/8/2022:  The left ventricle is normal in size.  Left ventricular systolic function is normal.  The visual ejection fraction is 60-65%.  No regional wall motion abnormalities noted.  The right ventricle is mildly dilated.  TAPSE is normal, which is consistent with normal right ventricular  "systolic  function.  The left atrium is mildly dilated.  The right atrium is mildly dilated.  Mobile intratrial septum.PFO is not fully excluded  No significant valve abnormality    Chest CTA done 1/21/2022:  1.  No pulmonary emboli. Normal thoracic aorta.  2.  Minimal calcified atheromatous plaque proximal LAD coronary artery.  3.  Chest otherwise negative.    I have reviewed and updated the patient's Past Medical History, Social History, Family History and Medication List.     Physical Examination  Review of Systems   Vitals: /70 (BP Location: Right arm, Patient Position: Sitting, Cuff Size: Adult Regular)   Pulse 72   Resp 16   Ht 1.803 m (5' 11\")   Wt 85.3 kg (188 lb)   BMI 26.22 kg/m    BMI= Body mass index is 26.22 kg/m .  Wt Readings from Last 3 Encounters:   02/24/22 85.3 kg (188 lb)   01/25/22 86.6 kg (191 lb)   01/21/22 83.9 kg (185 lb)       General Appearance:   Alert, well-appearing and in no acute distress.   HEENT: Atraumatic, normocephalic.  No scleral icterus, normal conjunctivae, EOMs intact, PERRL.  Wearing a mask.   Chest/Lungs:   Chest symmetric, spine straight.  Respirations unlabored.  Lungs are clear to auscultation.   Cardiovascular:   Regular rate and rhythm.  Normal first and second heart sounds with no murmurs, rubs, or gallops; radial and posterior tibial pulses are intact, No edema.   Abdomen:  Soft, nondistended, bowel sounds present.   Extremities: No cyanosis or clubbing.   Musculoskeletal: Moves all extremities.    Skin: Warm, dry, intact.    Neurologic: Mood and affect are appropriate.  Alert and oriented to person, place, time, and situation.        ROS: 10 point ROS neg other than the symptoms noted above in the HPI.         Medical History  Surgical History Family History Social History   Past Medical History:   Diagnosis Date     Atrial fibrillation (H)      Coronary artery disease     minimal calcification prox LAD seen on CT     Past Surgical History:   Procedure " "Laterality Date     REPLACEMENT TOTAL KNEE Left     x 2     Family History   Problem Relation Age of Onset     Cerebrovascular Disease Mother         due to birth control pills     No Known Problems Father      Peptic Ulcer Disease Brother         Social History     Socioeconomic History     Marital status: Single     Spouse name: Not on file     Number of children: Not on file     Years of education: Not on file     Highest education level: Not on file   Occupational History     Not on file   Tobacco Use     Smoking status: Former Smoker     Smokeless tobacco: Never Used   Substance and Sexual Activity     Alcohol use: Not on file     Drug use: Not on file     Sexual activity: Not on file   Other Topics Concern     Parent/sibling w/ CABG, MI or angioplasty before 65F 55M? Not Asked   Social History Narrative     Not on file     Social Determinants of Health     Financial Resource Strain: Not on file   Food Insecurity: Not on file   Transportation Needs: Not on file   Physical Activity: Not on file   Stress: Not on file   Social Connections: Not on file   Intimate Partner Violence: Not on file   Housing Stability: Not on file           Medications  Allergies   Current Outpatient Medications   Medication Sig Dispense Refill     aspirin 81 MG EC tablet Take 81 mg by mouth daily       metoprolol succinate ER (TOPROL-XL) 25 MG 24 hr tablet Take 1 tablet (25 mg) by mouth daily 90 tablet 3       Allergies   Allergen Reactions     Naproxen      Couldn't swallow     Codeine Unknown     Ibuprofen Other (See Comments)     \"makes my joints creak.\"     Opium Other (See Comments)     All opiates make pt unable to urinate.          Lab Results    Chemistry/lipid CBC Cardiac Enzymes/BNP/TSH/INR   No results for input(s): CHOL, HDL, LDL, TRIG, CHOLHDLRATIO in the last 88428 hours.  No results for input(s): LDL in the last 24113 hours.  Recent Labs   Lab Test 01/21/22  1326      POTASSIUM 4.3   CHLORIDE 107   CO2 22    "   BUN 24*   CR 1.15   GFRESTIMATED 71   MARGUERITE 10.3     Recent Labs   Lab Test 01/21/22  1326   CR 1.15     No results for input(s): A1C in the last 44607 hours.       Recent Labs   Lab Test 01/21/22  1326   WBC 10.6   HGB 18.2*   HCT 54.1*   MCV 91        Recent Labs   Lab Test 01/21/22  1326   HGB 18.2*    Recent Labs   Lab Test 01/21/22  1326   TROPONINI <0.01     Recent Labs   Lab Test 01/21/22  1326   BNP 21     No results for input(s): TSH in the last 00391 hours.  No results for input(s): INR in the last 61286 hours.   54 minutes were spent on the date of encounter performing chart review, history and exam, documentation, and further activities as noted above.

## 2022-02-24 NOTE — Clinical Note
Patient would like to pursue PVI with Dr. Galan.  Consult scheduled for 3/7/2022.  Needs patient assistance for DOAC, information given today.  Thanks,  Claire

## 2022-02-24 NOTE — PATIENT INSTRUCTIONS
Lennox Capellan,    It was a pleasure to see you today at the [facility].     My recommendations after this visit include:    Increase metoprolol succinate to 50 mg daily    Keep a log of A fib episodes (frequency and duration)    Plan for pulmonary vein isolation ablation    Follow up with Dr. Adama Rosario, [facility], Electrophysiology  [phone]  EP nurses [phone]

## 2022-03-07 ENCOUNTER — OFFICE VISIT (OUTPATIENT)
Dept: CARDIOLOGY | Facility: CLINIC | Age: 67
End: 2022-03-07
Payer: MEDICARE

## 2022-03-07 VITALS
HEART RATE: 80 BPM | DIASTOLIC BLOOD PRESSURE: 70 MMHG | BODY MASS INDEX: 25.2 KG/M2 | RESPIRATION RATE: 17 BRPM | WEIGHT: 180 LBS | SYSTOLIC BLOOD PRESSURE: 128 MMHG | HEIGHT: 71 IN

## 2022-03-07 DIAGNOSIS — I48.0 PAROXYSMAL ATRIAL FIBRILLATION (H): ICD-10-CM

## 2022-03-07 PROCEDURE — 99205 OFFICE O/P NEW HI 60 MIN: CPT | Performed by: INTERNAL MEDICINE

## 2022-03-07 NOTE — PROGRESS NOTES
[facility]  Cardiac Electrophysiology  [address]  [place] [address]   Office: [phone]  Fax: [phone]     Cardiac Electrophysiology Consultation    Patient: Lennox Capellan   : 1955     Referring Provider: Claire Rosario CNP  Primary Care Provider: Ivonne Mallory MD    CHIEF COMPLAINT/REASON FOR CONSULTATION  Paroxysmal atrial fibrillation and possible atrial flutter    Assessment/Recommendations   Lennox Capellan is a 66 year old male with paroxysmal atrial fibrillation and possible atrial flutter, RBBB, mild CAD (2022 coronary CTA) referred by Claire Rosario for evaluation of atrial fibrillation.    Paroxysmal atrial fibrillation and possible atrial flutter - symptomatic with palpitations  [id number] 2  We reviewed the pathophysiology of atrial fibrillation and management considerations including stroke risk and anticoagulation, rate control, cardioversion, antiarrhythmic drug therapy, and catheter ablation.  We discussed atrial fibrillation ablation procedures, anticipated success rates, the potential need for re-do ablation vs addition of anti-arrhythmic drugs, procedural risks (including groin bleeding, tamponade, phrenic or esophageal injury, stroke, pulmonary vein stenosis) and recovery expectations.  He would prefer expectant management for now, with further consideration for medical therapy or catheter ablation in case of progressive increase in atrial fibrillation/flutter symptom burden  - he will continue to track AF/AFl recurrences, and will contact us when these are at sufficient burden to seek escalation of therapy  - our office will assess whether cost-offset programs for a DOAC (eg. apixaban 5mg twice daily, rivaroxaban 20mg daily, dabigatran 150mg twice daily) can be used for longterm therapeutic anticoagulation.  If this is not an option, he would initiate coumadin and establish contact with an anticoagulation clinic  - continue  "metoprolol XL 50mg daily  - discussed the ongoing importance of lifestyle modification (maintaining a healthy weight, sleep apnea diagnosis and management, alcohol avoidance) as part of a long term strategy for atrial fibrillation management    Follow up:          History of Present Illness   Lennox Capellan is a 66 year old male with paroxysmal atrial fibrillation and possible atrial flutter, RBBB, mild CAD (1/21/2022 coronary CTA) referred by Claire Rosario for evaluation of atrial fibrillation.    Mr. Capellan notes intermittent episodes of palpitations since early 2020.  He had ER evaluation 1/21/2022 with note of atrial fibrillation vs atrial flutter, ventricular rate 150bpm.  He converted to sinus rhythm after metoprolol administration.  He was started on metoprolol XL 25mg daily, subsequently increased to 50mg daily.  He has done well since that time without known atrial fibrillation or flutter recurrence.    He denies chest pain, syncope.   He notes that he has struggled immensely with pain and stress following problems after left knee replacement, and has restricted financial means being unable to work and after yet unsuccessfully seeking damages.        Physical Examination  Review of Systems   VITALS: /70 (BP Location: Right arm, Patient Position: Sitting, Cuff Size: Adult Large)   Pulse 80   Resp 17   Ht 1.803 m (5' 11\")   Wt 81.6 kg (180 lb)   BMI 25.10 kg/m    Wt Readings from Last 3 Encounters:   02/24/22 85.3 kg (188 lb)   01/25/22 86.6 kg (191 lb)   01/21/22 83.9 kg (185 lb)     CONSTITUTIONAL: well nourished, comfortable, no distress  EYES:  Conjunctivae pink, sclerae clear.    E/N/T:  Oral mucosa pink  RESPIRATORY:  Respiratory effort is normal  CARDIOVASCULAR:  normal S1 and S2  GASTROINTESTINAL:  Abdomen without masses or tenderness  EXTREMITIES:  No clubbing or cyanosis.    MUSCULOSKELETAL:  Overall grossly normal muscle strength  SKIN:  Overall, skin warm and dry, no " "lesions.  NEURO/PSYCH:  Oriented x 3 with normal affect.   Constitutional:  No weight loss or loss of appetite    Eyes:  No difficulty with vision, no double vision, no dry eyes  ENT:  No sore throat, difficulty swallowing; changes in hearing or tinnitus  Cardiovascular: As detailed above  Respiratory:  No cough  Musculoskeletal  No joint pain, muscle aches  Neurologic:  No syncope, lightheadedness, fainting spells   Hematologic: No easy bruising, excessive bleeding tendency   Gastrointestinal:  No jaundice, abdominal pain or abdominal bloating  Genitourinary: No changes in urinary habits, no trouble urinating    Psychiatric: No anxiety or depression      Medical History  Surgical History   Past Medical History:   Diagnosis Date     Atrial fibrillation (H)      Coronary artery disease     minimal calcification prox LAD seen on CT    Past Surgical History:   Procedure Laterality Date     REPLACEMENT TOTAL KNEE Left     x 2         Family History Social History   Family History   Problem Relation Age of Onset     Cerebrovascular Disease Mother         due to birth control pills     No Known Problems Father      Peptic Ulcer Disease Brother         Social History     Tobacco Use     Smoking status: Former Smoker     Smokeless tobacco: Never Used   Substance Use Topics     Alcohol use: Not on file     Drug use: Not on file         Medications  Allergies     Current Outpatient Medications:      aspirin 81 MG EC tablet, Take 81 mg by mouth daily, Disp: , Rfl:      metoprolol succinate ER (TOPROL-XL) 50 MG 24 hr tablet, Take 1 tablet (50 mg) by mouth daily, Disp: 90 tablet, Rfl: 3     Allergies   Allergen Reactions     Naproxen      Couldn't swallow     Codeine Unknown     Ibuprofen Other (See Comments)     \"makes my joints creak.\"     Opium Other (See Comments)     All opiates make pt unable to urinate.          Lab Results    Chemistry CBC Cardiac Enzymes/BNP/TSH/INR   Recent Labs   Lab Test 01/21/22  1326    "   POTASSIUM 4.3   CHLORIDE 107   CO2 22      BUN 24*   CR 1.15   GFRESTIMATED 71   MARGUERITE 10.3     Recent Labs   Lab Test 01/21/22  1326   CR 1.15          Recent Labs   Lab Test 01/21/22  1326   WBC 10.6   HGB 18.2*   HCT 54.1*   MCV 91        Recent Labs   Lab Test 01/21/22  1326   HGB 18.2*    Recent Labs   Lab Test 01/21/22  1326   TROPONINI <0.01     Recent Labs   Lab Test 01/21/22  1326   BNP 21     No results for input(s): TSH in the last 26932 hours.  No results for input(s): INR in the last 67967 hours.      Data Review    ECGs (tracings independently reviewed)  2/24/2022 - SR 59bpm, RBBB QRS 152ms  1/21/2022 - AF vs AFl, ventricular rate 148bpm, RBBB    2/8/2022 TTE  The left ventricle is normal in size.  Left ventricular systolic function is normal.  The visual ejection fraction is 60-65%.  No regional wall motion abnormalities noted.  The right ventricle is mildly dilated.  TAPSE is normal, which is consistent with normal right ventricular systolic  function.  The left atrium is mildly dilated.  The right atrium is mildly dilated.  Mobile intratrial septum.PFO is not fully excluded  No significant valve abnormality       Cc: Claire Rosario CNP, Aldo Topete MD, Ivonne Mallory MD Amila Dilusha William, MD  3/7/2022  3:52 PM

## 2022-03-07 NOTE — PATIENT INSTRUCTIONS
[facility]  Cardiac Electrophysiology  [address]  [place] [address]   Office: [phone]  Fax: [phone]       Thank you for seeing us in clinic today - it is a pleasure to be a part of your care team.  Below is a summary of our plan from today's visit.       You have paroxysmal atrial fibrillation and atrial flutter, presently being managed with metoprolol XL 50mg daily.  We reviewed physiology and management options including antiarrhythmic drug therapy, catheter ablation and lifestyle modification.  We will plan for the following:  - continue metoprolol XL for now  - our office will query options regarding anticoagulation (blood thinners) and will contact you for further information  - continue to follow for atrial fibrillation and flutter recurrences.  Consider keeping a log of these.  Contact us with frequent or extended duration breakthrough episodes.     Please do not hesitate to be in touch with our office at [phone] with any questions that may arise.       Thank you for trusting us with your care,    Pauline Galan MD  Clinical Cardiac Electrophysiology  [facility]  [address] [place] [address]   Office: [phone]  Fax: [phone]                ATRIAL FIBRILLATION: Patient Information    What is atrial fibrillation?  Atrial fibrillation (AF, A-fib) is a common heart rhythm problem (arrhythmia) occurring within the upper chambers of the heart (the atria).  In normal rhythm, the upper and lower chambers of the heart are electrically driven to contract in a coordinated sequence.  In atrial fibrillation, the atria lose their ability to contract because of rapid and chaotic electrical activity.  The lower chambers of the heart (the ventricles) continue to pump blood throughout the body, though with irregular and often faster rate due to the chaotic activity within the atria.        How do I know if I  have atrial fibrillation?   Some people may feel their heart beating faster, harder, or irregularly while in atrial fibrillation.  Others may be lightheaded, fatigued, feel weak or tired or become more short of breath especially with activities.  Some patients have no symptoms at all.  Atrial fibrillation may be found due to an irregular pulse or on an electrocardiogram (ECG). Atrial fibrillation can start and stop on its own, and episodes can last from seconds to several months.      How common is atrial fibrillation?   An estimated 3-6 million people in the [place] have atrial fibrillation.  Atrial fibrillation is a common heart rhythm problem for older persons, affecting as estimated 12-15% of people over the age of 65 years of age.    What causes atrial fibrillation?   Age is the most important risk factor for atrial fibrillation.  Atrial fibrillation is more common in people with other heart disease, high blood pressure, diabetes, obesity, sleep apnea and in people who regularly consume alcohol.  Surgery, lung disease, or thyroid problems can lead to atrial fibrillation.  Atrial fibrillation has multiple possible causes, and in most cases a single cause cannot be found.  Atrial fibrillation is a progressive condition, usually starting with at an early stage with short and infrequent episodes.  In later stages of disease, more frequent and longer lasting episodes of atrial fibrillation occur, ultimately culminating in episodes which do not spontaneously terminate.  Generally, more enlargement and scarring within the upper chambers of the heart is observed as atrial fibrillation progresses from early to late-stage disease.    How is atrial fibrillation diagnosed and evaluated?    Because of its start-stop nature, atrial fibrillation can be challenging to diagnose.  Atrial fibrillation is most commonly diagnosed via cardiac rhythm recordings - either an ECG or wearable cardiac rhythm monitor.  For patients  with pacemakers, defibrillators or implantable loop recorders, atrial fibrillation may be recorded via these devices.  Recently, commercially available devices (eg. Apple Watch, [employer] device, certain [employer] devices, others) can allow patients to take 30 second cardiac rhythm recordings which may document atrial fibrillation.  Once atrial fibrillation is diagnosed, additional tests include blood tests and an echocardiogram.  The echocardiogram uses ultrasound to look at your heart to assess your cardiac function and evaluate for other heart disease.  Additional evaluation may include CT or MRI studies.    Is atrial fibrillation dangerous?   Atrial fibrillation is not usually a life-threatening arrhythmia.  The most serious consequences of atrial fibrillation including stroke and worsening of overall cardiac function.  While in atrial fibrillation, the upper cardiac chambers do not contract normally, resulting in slower blood flow and increased risk of clot formation.  If this blood clot becomes detached from the heart a stroke can occur.  Unfortunately, stroke can be the first sign of atrial fibrillation for some people.  With a stroke, you may notice abnormal sensation, weakness on one side of the body or face, changes in your vision or speech.  If you have any of these signs, you should contact EMS and be evaluated in an emergency room as soon as possible.      How is atrial fibrillation treated?     Several treatment options exist for suppressing atrial fibrillation - however, it is not an easily curable arrhythmia.  The first goal in managing atrial fibrillation is to minimize stroke risk.  The second goal is to improve symptoms associated with atrial fibrillation.  Finally, in patients with reduced cardiac function, maintaining normal rhythm can help improve cardiac function.      Blood thinners are used to reduce the risk of stroke in patients with high estimated stroke risk related to atrial fibrillation.   For patients at higher risk of bleeding related to blood thinner use, implantable devices may be an option to reduce stroke risk without the need for long term blood thinner use.      Atrial fibrillation can be managed via two strategies: rate control and rhythm control.  In a rate control strategy, continued atrial fibrillation is accepted and medications (eg. beta-blockers or calcium channel blockers) are used to control the lower chamber rate.  In a rhythm control strategy, anti-arrhythmic medications or catheter ablation are used to maintain normal cardiac rhythm and slow disease progression by suppressing atrial fibrillation.  A procedure called a cardioversion, in which an electric shock is delivered through patches placed on the chest wall while under deep sedation, can be performed to temporarily restore normal cardiac rhythm, though does not address the chance of atrial fibrillation recurrence.  Treatments are more effective for earlier rather than later stage atrial fibrillation.  Lifestyle modifications (maintaining a healthy weight, aerobic exercise, diagnosing and treating sleep apnea, and minimizing alcohol intake) are important elements of atrial fibrillation rhythm control.     What is catheter ablation for atrial fibrillation?  Cardiac catheter ablation is a commonly performed, minimally invasive procedure performed by a cardiac electrophysiologist to treat many different cardiac rhythm abnormalities.  During catheter ablation, long, thin, flexible tubes are advanced into the heart via small sheaths inserted into the femoral veins and thermal energy (either heating or cooling) is applied within the heart to disrupt abnormal electrical activity.  Atrial fibrillation ablation is performed under general anesthesia, with procedures generally taking approximately 2-3 hours.  Patients are typically observed for 3-5 hours after the ablation, and in most cases can be discharged home the same day.  Atrial  fibrillation ablation is associated with better outcomes (mortality, cardiovascular hospitalizations, atrial arrhythmia recurrences) compared to antiarrhythmic drug therapy.  However, atrial fibrillation recurrences are not uncommon, and repeat catheter ablation may be offered.  Your electrophysiology team can review atrial fibrillation ablation, anticipated success rates, risks, and recovery expectations with you.    What are anti-arrhythmic medications?  Anti-arrhythmic medications are specialized drugs which alter cardiac electrical functioning to suppress arrhythmias.  There are several anti-arrhythmic medications available, each with its own success rate and side effects.  Some anti-arrhythmic medications are less effective though safer to use, others are more effective though have serious potential toxicities.  Atrial fibrillation recurrences are common and may require dose adjustment or change in antiarrhythmic therapy.  Your electrophysiology team will carefully consider which medication would be the best and safest for your particular case.      Can I live a normal life?    The goal of atrial fibrillation management is for patients to live normal lives without being limited by symptoms related to atrial fibrillation.    Are any additional educational resources available?  There are a number of excellent atrial fibrillation education resources available to you online.  A few options you may wish to review include:  hrsonline.org/guide-atrial-fibrillation  afibmatters.org  getsmartaboutafib.com  stopaf.com    What comes next?    Consider your management options and let us know how we can help in your decision process.  Please take medications as they have been prescribed.  You should also get any tests that may have been ordered for you.      When to Call Your Doctor or seek emergency care:  Call your doctor or seek emergency care if you have any significant changes with the  following:    Weakness    Dizziness    Fainting    Fatigue    Shortness of breath    Chest pain with increased activity    If you are concerned that your heart rate is too fast or too slow    Bleeding that does not stop in 10 minutes    Coughing or throwing up blood    Bloody diarrhea or bleeding hemorrhoids    Dark-colored urine or black stool  Allergic reactions:    Rash    Itching    Swelling    Trouble breathing or swallowing      Please call the [facility] at [phone] if you have concerns about your symptoms, your medicines, or your follow-up appointments.

## 2022-03-07 NOTE — LETTER
3/7/2022    Ivonne Mallory MD  [address]  [facility] [address]    RE: Lennox Capellan       Dear Colleague,     I had the pleasure of seeing Lennox Capellan in the [facility].     [facility]  Cardiac Electrophysiology  [address]  [place] [address]   Office: [phone]  Fax: [phone]     Cardiac Electrophysiology Consultation    Patient: Lennox Capellan   : 1955     Referring Provider: Claire Rosario CNP  Primary Care Provider: Ivonne Mallory MD    CHIEF COMPLAINT/REASON FOR CONSULTATION  Paroxysmal atrial fibrillation and possible atrial flutter    Assessment/Recommendations   Lennox Capellan is a 66 year old male with paroxysmal atrial fibrillation and possible atrial flutter, RBBB, mild CAD (2022 coronary CTA) referred by Claire Rosario for evaluation of atrial fibrillation.    Paroxysmal atrial fibrillation and possible atrial flutter - symptomatic with palpitations  [id number]  We reviewed the pathophysiology of atrial fibrillation and management considerations including stroke risk and anticoagulation, rate control, cardioversion, antiarrhythmic drug therapy, and catheter ablation.  We discussed atrial fibrillation ablation procedures, anticipated success rates, the potential need for re-do ablation vs addition of anti-arrhythmic drugs, procedural risks (including groin bleeding, tamponade, phrenic or esophageal injury, stroke, pulmonary vein stenosis) and recovery expectations.  He would prefer expectant management for now, with further consideration for medical therapy or catheter ablation in case of progressive increase in atrial fibrillation/flutter symptom burden  - he will continue to track AF/AFl recurrences, and will contact us when these are at sufficient burden to seek escalation of therapy  - our office will assess whether cost-offset programs for a DOAC (eg. apixaban 5mg twice daily, rivaroxaban 20mg daily, dabigatran  "150mg twice daily) can be used for longterm therapeutic anticoagulation.  If this is not an option, he would initiate coumadin and establish contact with an anticoagulation clinic  - continue metoprolol XL 50mg daily  - discussed the ongoing importance of lifestyle modification (maintaining a healthy weight, sleep apnea diagnosis and management, alcohol avoidance) as part of a long term strategy for atrial fibrillation management    Follow up:          History of Present Illness   Lennox Capellan is a 66 year old male with paroxysmal atrial fibrillation and possible atrial flutter, RBBB, mild CAD (1/21/2022 coronary CTA) referred by Claire Rosario for evaluation of atrial fibrillation.    Mr. Capellan notes intermittent episodes of palpitations since early 2020.  He had ER evaluation 1/21/2022 with note of atrial fibrillation vs atrial flutter, ventricular rate 150bpm.  He converted to sinus rhythm after metoprolol administration.  He was started on metoprolol XL 25mg daily, subsequently increased to 50mg daily.  He has done well since that time without known atrial fibrillation or flutter recurrence.    He denies chest pain, syncope.   He notes that he has struggled immensely with pain and stress following problems after left knee replacement, and has restricted financial means being unable to work and after yet unsuccessfully seeking damages.        Physical Examination  Review of Systems   VITALS: /70 (BP Location: Right arm, Patient Position: Sitting, Cuff Size: Adult Large)   Pulse 80   Resp 17   Ht 1.803 m (5' 11\")   Wt 81.6 kg (180 lb)   BMI 25.10 kg/m    Wt Readings from Last 3 Encounters:   02/24/22 85.3 kg (188 lb)   01/25/22 86.6 kg (191 lb)   01/21/22 83.9 kg (185 lb)     CONSTITUTIONAL: well nourished, comfortable, no distress  EYES:  Conjunctivae pink, sclerae clear.    E/N/T:  Oral mucosa pink  RESPIRATORY:  Respiratory effort is normal  CARDIOVASCULAR:  normal S1 and S2  GASTROINTESTINAL:  " "Abdomen without masses or tenderness  EXTREMITIES:  No clubbing or cyanosis.    MUSCULOSKELETAL:  Overall grossly normal muscle strength  SKIN:  Overall, skin warm and dry, no lesions.  NEURO/PSYCH:  Oriented x 3 with normal affect.   Constitutional:  No weight loss or loss of appetite    Eyes:  No difficulty with vision, no double vision, no dry eyes  ENT:  No sore throat, difficulty swallowing; changes in hearing or tinnitus  Cardiovascular: As detailed above  Respiratory:  No cough  Musculoskeletal  No joint pain, muscle aches  Neurologic:  No syncope, lightheadedness, fainting spells   Hematologic: No easy bruising, excessive bleeding tendency   Gastrointestinal:  No jaundice, abdominal pain or abdominal bloating  Genitourinary: No changes in urinary habits, no trouble urinating    Psychiatric: No anxiety or depression      Medical History  Surgical History   Past Medical History:   Diagnosis Date     Atrial fibrillation (H)      Coronary artery disease     minimal calcification prox LAD seen on CT    Past Surgical History:   Procedure Laterality Date     REPLACEMENT TOTAL KNEE Left     x 2         Family History Social History   Family History   Problem Relation Age of Onset     Cerebrovascular Disease Mother         due to birth control pills     No Known Problems Father      Peptic Ulcer Disease Brother         Social History     Tobacco Use     Smoking status: Former Smoker     Smokeless tobacco: Never Used   Substance Use Topics     Alcohol use: Not on file     Drug use: Not on file         Medications  Allergies     Current Outpatient Medications:      aspirin 81 MG EC tablet, Take 81 mg by mouth daily, Disp: , Rfl:      metoprolol succinate ER (TOPROL-XL) 50 MG 24 hr tablet, Take 1 tablet (50 mg) by mouth daily, Disp: 90 tablet, Rfl: 3     Allergies   Allergen Reactions     Naproxen      Couldn't swallow     Codeine Unknown     Ibuprofen Other (See Comments)     \"makes my joints creak.\"     Opium Other " (See Comments)     All opiates make pt unable to urinate.          Lab Results    Chemistry CBC Cardiac Enzymes/BNP/TSH/INR   Recent Labs   Lab Test 01/21/22  1326      POTASSIUM 4.3   CHLORIDE 107   CO2 22      BUN 24*   CR 1.15   GFRESTIMATED 71   MARGUERITE 10.3     Recent Labs   Lab Test 01/21/22  1326   CR 1.15          Recent Labs   Lab Test 01/21/22  1326   WBC 10.6   HGB 18.2*   HCT 54.1*   MCV 91        Recent Labs   Lab Test 01/21/22  1326   HGB 18.2*    Recent Labs   Lab Test 01/21/22  1326   TROPONINI <0.01     Recent Labs   Lab Test 01/21/22  1326   BNP 21     No results for input(s): TSH in the last 16448 hours.  No results for input(s): INR in the last 23696 hours.      Data Review    ECGs (tracings independently reviewed)  2/24/2022 - SR 59bpm, RBBB QRS 152ms  1/21/2022 - AF vs AFl, ventricular rate 148bpm, RBBB    2/8/2022 TTE  The left ventricle is normal in size.  Left ventricular systolic function is normal.  The visual ejection fraction is 60-65%.  No regional wall motion abnormalities noted.  The right ventricle is mildly dilated.  TAPSE is normal, which is consistent with normal right ventricular systolic  function.  The left atrium is mildly dilated.  The right atrium is mildly dilated.  Mobile intratrial septum.PFO is not fully excluded  No significant valve abnormality       Cc: Claire Rosario CNP, Aldo Topete MD, Ivonne Mallory MD Amila Dilusha William, MD  3/7/2022  3:52 PM        Thank you for allowing me to participate in the care of your patient.      Sincerely,     Pauline Galan MD     [facility]  cc:   LAURIE Hidalgo CNP  [address] [place] [address]

## 2022-03-08 ENCOUNTER — TELEPHONE (OUTPATIENT)
Dept: CARDIOLOGY | Facility: CLINIC | Age: 67
End: 2022-03-08
Payer: MEDICARE

## 2022-03-08 NOTE — TELEPHONE ENCOUNTER
Pharmacy-  Please see Dr Galan's request  What is the best most affordable option for this pt?  Thank you  Elly Galan, Pauline Pringle MD  P [facility] Ep Support [facility],     If possible, could we please assist Mr. High in assessing cost-offset programs for long term DOAC?  Apixaban 5mg twice daily, rivaroxaban 20mg daily, or dabigatran 150mg twice daily would be very okay.  If there aren't any long term options available to him, let's get him connected with a coumadin clinic to initiation warfarin.     Thank you!   Yary

## 2022-03-15 NOTE — TELEPHONE ENCOUNTER
"Phone call to pt to let him know he would need to call his pharmacy to see what DOAC would be formulary for him.  Pt states he does not have insurance for medications.  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  I refuse to be on hold and I have been on hold my entire life I refuse to be on hold.  If you can't give me the medication yourself then I thank you for your help and good bye\"    I apologized for his history with medication but that I am calling to start him on anticoagulation as he has AFIB it will help prevent him from having a stroke.  I let him know that what we can offer is to send the medication to the pharmacy for him to  and if its too expensive he can just give us a call back and we will send in the warfarin.  Pt states he does not think he wants to do this.  Offered that we can move forward with Warfarin as it is cost effective and he states again \"if we can't give him the medication for free he can't afford it then that's that thank you for caring goodbye\"    I asked patient to think about the two options and we would try to call back to see if he would agree to start warfarin and get scheduled with coumadin clinic.    Pt was upset I was unable to hand over free medication as he said that is \"your job and that Dr. Galan should have passed that information on to you\"    Will postpone the call for us to call back in 1 week to see if he agrees to move forward with starting AC.    Dalia  "

## 2022-03-22 NOTE — TELEPHONE ENCOUNTER
PC back to patient to inquire about a decision regarding anticoagulation.  Patient quickly raised his voice and became angry.   He is uninterested in a NOAC or warfarin at this time.  Tried to explain the assistance program, patient not willing to take in information.  Reviewed that he cannot afford a NOAC and warfarin is too dangerous.  It is clear I am unable to help the patient, and he is angry.  Pt ended call.  Dr. Galan updated as LUKAS.    SEAN

## 2022-03-22 NOTE — TELEPHONE ENCOUNTER
Pauline Galan MD Westlund, Jessica, RN  Replies will be sent to [facility] Cv Support Pool - e  Caller: Unspecified (2 weeks ago)  Sorry this has been so challenging - I am thankful for your care and diligence in trying to assist him.  I believe he is aware of the nature of our considerations, though struggles in context of his prior issues - we will step back and allow him to reach out to us should he want to re-engage.     Thank you again for your help!   Yary

## 2024-10-24 ENCOUNTER — HOSPITAL ENCOUNTER (INPATIENT)
Facility: CLINIC | Age: 69
Setting detail: SURGERY ADMIT
End: 2024-10-24
Attending: STUDENT IN AN ORGANIZED HEALTH CARE EDUCATION/TRAINING PROGRAM | Admitting: STUDENT IN AN ORGANIZED HEALTH CARE EDUCATION/TRAINING PROGRAM
Payer: MEDICARE